# Patient Record
Sex: FEMALE | Race: WHITE | NOT HISPANIC OR LATINO | Employment: OTHER | URBAN - METROPOLITAN AREA
[De-identification: names, ages, dates, MRNs, and addresses within clinical notes are randomized per-mention and may not be internally consistent; named-entity substitution may affect disease eponyms.]

---

## 2017-07-26 ENCOUNTER — HOSPITAL ENCOUNTER (OUTPATIENT)
Facility: MEDICAL CENTER | Age: 27
End: 2017-07-26
Attending: SPECIALIST

## 2019-12-28 ENCOUNTER — APPOINTMENT (OUTPATIENT)
Dept: RADIOLOGY | Facility: MEDICAL CENTER | Age: 29
End: 2019-12-28
Payer: COMMERCIAL

## 2019-12-28 ENCOUNTER — HOSPITAL ENCOUNTER (EMERGENCY)
Facility: MEDICAL CENTER | Age: 29
End: 2019-12-28
Attending: EMERGENCY MEDICINE
Payer: COMMERCIAL

## 2019-12-28 ENCOUNTER — APPOINTMENT (OUTPATIENT)
Dept: RADIOLOGY | Facility: MEDICAL CENTER | Age: 29
End: 2019-12-28
Attending: EMERGENCY MEDICINE
Payer: COMMERCIAL

## 2019-12-28 VITALS
BODY MASS INDEX: 22.27 KG/M2 | WEIGHT: 125.66 LBS | HEIGHT: 63 IN | DIASTOLIC BLOOD PRESSURE: 68 MMHG | RESPIRATION RATE: 14 BRPM | TEMPERATURE: 98.7 F | HEART RATE: 72 BPM | OXYGEN SATURATION: 98 % | SYSTOLIC BLOOD PRESSURE: 110 MMHG

## 2019-12-28 DIAGNOSIS — R00.2 PALPITATIONS: ICD-10-CM

## 2019-12-28 LAB
ALBUMIN SERPL BCP-MCNC: 4.7 G/DL (ref 3.2–4.9)
ALBUMIN/GLOB SERPL: 1.6 G/DL
ALP SERPL-CCNC: 55 U/L (ref 30–99)
ALT SERPL-CCNC: 10 U/L (ref 2–50)
ANION GAP SERPL CALC-SCNC: 13 MMOL/L (ref 0–11.9)
AST SERPL-CCNC: 16 U/L (ref 12–45)
BASOPHILS # BLD AUTO: 0.8 % (ref 0–1.8)
BASOPHILS # BLD: 0.07 K/UL (ref 0–0.12)
BILIRUB SERPL-MCNC: 0.3 MG/DL (ref 0.1–1.5)
BUN SERPL-MCNC: 10 MG/DL (ref 8–22)
CALCIUM SERPL-MCNC: 9 MG/DL (ref 8.4–10.2)
CHLORIDE SERPL-SCNC: 106 MMOL/L (ref 96–112)
CO2 SERPL-SCNC: 23 MMOL/L (ref 20–33)
CREAT SERPL-MCNC: 0.62 MG/DL (ref 0.5–1.4)
EKG IMPRESSION: NORMAL
EOSINOPHIL # BLD AUTO: 0.12 K/UL (ref 0–0.51)
EOSINOPHIL NFR BLD: 1.4 % (ref 0–6.9)
ERYTHROCYTE [DISTWIDTH] IN BLOOD BY AUTOMATED COUNT: 36.7 FL (ref 35.9–50)
GLOBULIN SER CALC-MCNC: 2.9 G/DL (ref 1.9–3.5)
GLUCOSE SERPL-MCNC: 108 MG/DL (ref 65–99)
HCG SERPL QL: NEGATIVE
HCT VFR BLD AUTO: 41 % (ref 37–47)
HGB BLD-MCNC: 13.7 G/DL (ref 12–16)
IMM GRANULOCYTES # BLD AUTO: 0.03 K/UL (ref 0–0.11)
IMM GRANULOCYTES NFR BLD AUTO: 0.4 % (ref 0–0.9)
LYMPHOCYTES # BLD AUTO: 3.05 K/UL (ref 1–4.8)
LYMPHOCYTES NFR BLD: 36.4 % (ref 22–41)
MAGNESIUM SERPL-MCNC: 1.9 MG/DL (ref 1.5–2.5)
MCH RBC QN AUTO: 30 PG (ref 27–33)
MCHC RBC AUTO-ENTMCNC: 33.4 G/DL (ref 33.6–35)
MCV RBC AUTO: 89.9 FL (ref 81.4–97.8)
MONOCYTES # BLD AUTO: 0.48 K/UL (ref 0–0.85)
MONOCYTES NFR BLD AUTO: 5.7 % (ref 0–13.4)
NEUTROPHILS # BLD AUTO: 4.62 K/UL (ref 2–7.15)
NEUTROPHILS NFR BLD: 55.3 % (ref 44–72)
NRBC # BLD AUTO: 0 K/UL
NRBC BLD-RTO: 0 /100 WBC
PLATELET # BLD AUTO: 279 K/UL (ref 164–446)
PMV BLD AUTO: 10.5 FL (ref 9–12.9)
POTASSIUM SERPL-SCNC: 3.8 MMOL/L (ref 3.6–5.5)
PROT SERPL-MCNC: 7.6 G/DL (ref 6–8.2)
RBC # BLD AUTO: 4.56 M/UL (ref 4.2–5.4)
SODIUM SERPL-SCNC: 142 MMOL/L (ref 135–145)
T4 FREE SERPL-MCNC: 1.21 NG/DL (ref 0.58–1.64)
TROPONIN T SERPL-MCNC: <6 NG/L (ref 6–19)
TSH SERPL DL<=0.005 MIU/L-ACNC: 1.91 UIU/ML (ref 0.38–5.33)
WBC # BLD AUTO: 8.4 K/UL (ref 4.8–10.8)

## 2019-12-28 PROCEDURE — 80053 COMPREHEN METABOLIC PANEL: CPT

## 2019-12-28 PROCEDURE — 93005 ELECTROCARDIOGRAM TRACING: CPT

## 2019-12-28 PROCEDURE — 83735 ASSAY OF MAGNESIUM: CPT

## 2019-12-28 PROCEDURE — 36415 COLL VENOUS BLD VENIPUNCTURE: CPT

## 2019-12-28 PROCEDURE — 99284 EMERGENCY DEPT VISIT MOD MDM: CPT

## 2019-12-28 PROCEDURE — 84443 ASSAY THYROID STIM HORMONE: CPT

## 2019-12-28 PROCEDURE — 85025 COMPLETE CBC W/AUTO DIFF WBC: CPT

## 2019-12-28 PROCEDURE — 71045 X-RAY EXAM CHEST 1 VIEW: CPT

## 2019-12-28 PROCEDURE — 93005 ELECTROCARDIOGRAM TRACING: CPT | Performed by: EMERGENCY MEDICINE

## 2019-12-28 PROCEDURE — 84484 ASSAY OF TROPONIN QUANT: CPT

## 2019-12-28 PROCEDURE — 84703 CHORIONIC GONADOTROPIN ASSAY: CPT

## 2019-12-28 PROCEDURE — 84439 ASSAY OF FREE THYROXINE: CPT

## 2019-12-28 SDOH — HEALTH STABILITY: MENTAL HEALTH: HOW OFTEN DO YOU HAVE A DRINK CONTAINING ALCOHOL?: NEVER

## 2019-12-29 NOTE — ED PROVIDER NOTES
ED Provider Note    CHIEF COMPLAINT  Chief Complaint   Patient presents with   • Palpitations       HPI  Patient is a 29-year-old female with a past medical history of intermittent palpitations who presents for continued sensations of a fast heart rate since yesterday.  She describes this as being similar onset with no specific triggers.  She does report having increased anxiety with the holidays, work and family and other related stresses previously was not having these problems for approximately 3 years.  When she did have these problems 3 years ago she underwent a Holter monitor evaluation and had no definitive findings on this work-up.  Since then she has been doing well.  She did note that she had at her appointment earlier this morning was feeling like she was having a sensation of a fast heart rate every 20 seconds or so that would last for several seconds and then resolved.  This went off and on regardless of positional movements or exertion.  She did have a single episode of feeling lightheaded upon standing after her appointment.  She otherwise denies any chest pressure or chest pain.  She has had some nausea without vomiting and has no acute bowel or bladder complaints.    She denies any recent traumatic injuries, falls, assaults.  He does report nonspecific weight loss of approximately 15 pounds over the last several months that she does not attribute to any increased exercise or dieting.  She does not endorse any temperature fluctuations, brittle hair, neck pain or swelling, and has no family history of thyroid disease.  She also denies any first-degree relatives with any known structural heart disease, early cardiac death.    REVIEW OF SYSTEMS  See HPI for further details. All other systems are negative.     PAST MEDICAL HISTORY     SOCIAL HISTORY  Social History     Tobacco Use   • Smoking status: Never Smoker   • Smokeless tobacco: Never Used   Substance and Sexual Activity   • Alcohol use: Never      "Frequency: Never   • Drug use: Never   • Sexual activity: Not on file     SURGICAL HISTORY  patient denies any surgical history    CURRENT MEDICATIONS  Home Medications    **Home medications have not yet been reviewed for this encounter**       ALLERGIES  No Known Allergies    PHYSICAL EXAM  VITAL SIGNS: /68   Pulse 72   Temp 37.1 °C (98.7 °F) (Temporal)   Resp 14   Ht 1.6 m (5' 3\")   Wt 57 kg (125 lb 10.6 oz)   LMP 12/14/2019 (Exact Date)   SpO2 98%   BMI 22.26 kg/m²   Pulse ox interpretation: I interpret this pulse ox as normal.  Genl: F sitting in chair comfortably, speaking clearly, appears in no acute distress   Head: NC/AT   ENT: Mucous membranes moist, posterior pharynx clear, uvula midline, nares patent bilaterally   Eyes: Normal sclera, pupils equal round reactive to light  Neck: Supple, FROM, no LAD appreciated   Pulmonary: Lungs are clear to auscultation bilaterally  Chest: No TTP  CV:  RRR, no murmur appreciated, pulses 2+ in both upper and lower extremities,  Abdomen: soft, NT/ND; no rebound/guarding, no masses palpated, no HSM   : no CVA or suprapubic tenderness  Musculoskeletal: Pain free ROM of the neck. Moving upper and lower extremities and spontaneous in coordinated fashion  Neuro: A&Ox4 (person, place, time, situation), speech fluent, gait steady, no focal deficits appreciated, No cerebellar signs.  Sensation is grossly intact in the distal upper and lower extremities  5/5 strength in  and dorsiflexion/plantar flexion of the ankles  Psych: Patient has an appropriate affect and behavior  Skin: No rash or lesions.  No pallor or jaundice.  No cyanosis.  Warm and dry.     DIAGNOSTIC STUDIES / PROCEDURES    EKG  EKG at 1647 shows: rate of 88, rhythm sinus, axis normal, intervals normal, QRS narrow, ST/T waves without acute ischemia or infarction    LABS  Labs Reviewed   CBC WITH DIFFERENTIAL - Abnormal; Notable for the following components:       Result Value    MCHC 33.4 (*)     " All other components within normal limits   COMP METABOLIC PANEL - Abnormal; Notable for the following components:    Anion Gap 13.0 (*)     Glucose 108 (*)     All other components within normal limits   TROPONIN   ESTIMATED GFR   MAGNESIUM   HCG QUAL SERUM   FREE THYROXINE   TSH     RADIOLOGY  DX-CHEST-PORTABLE (1 VIEW)   Final Result      No acute cardiopulmonary disease.        COURSE & MEDICAL DECISION MAKING  Pertinent Labs & Imaging studies reviewed. (See chart for details)    DDX:  ACS unlikely  Pneumothorax  Pulmonary embolism  Esophagitis  Electrolyte abnormality  Myocarditis  Endocarditis  Pancreatitis  GERD  Anxiety  Thyroid dysfunction    MDM    Initial evaluation at 1820:  Patient presents with a chief complaint of intermittent palpitations with a history very remote and with current continued sensations of palpitations despite not having any marked arrhythmias on the monitor.  With my initial primary concern includes the differential listed above. Triage vital signs are reviewed and are reassuring. PERC score is negative.  On further discussion the patient does have a remote history of high-dose estrogen use though this is many many months removed prior to the initiation of her current complaints.  She is not currently on estrogen therapy, had no hypoxia or other acute risk factors at this time and after discussion of the pros and cons of getting a dimer testing I do not feel like she is demonstrating any signs of right heart strain and with a negative troponin or any other acute evidence of strain we will proceed with the other broad differential as noted above and no CTA is obtained.    A chest xray obtained and is unremarkable for pneumonia, pneumothorax, or widened mediastinum on my read. Laboratory analysis is obtained to evaluate for myocardial ischemia, evidence of infection, electrolyte abnormality, and abdominal sources for a cause for the patient's chest pain which are all reassuring.   Normal Mag and TSH. Troponin is not elevated.  Low likelihood of ACS at this time. EKG without acute ischemia, infarction, dysrhythmia.    ?  As there are no significant laboratory findings, a nonischemic EKG, and troponin.  There is no gross thyroid derangement or electrolyte abnormalities.  As she has been on a cardiac monitor for the entirety of these work-ups and no acute dysrhythmia has been identified I do not think there is an acute cardiac component.  She will need to follow-up with her outpatient physician to schedule any further Holter monitoring and further testing.  Questions are addressed at this time and she is given very strict return precautions should she feel short of breath, develop chest pain, or is acutely worsening.  The patient verbalizes understanding and will return for any interval changes.    HEART SCORE:0    FINAL IMPRESSION  Visit Diagnoses     ICD-10-CM   1. Palpitations R00.2     Electronically signed by: Cruzito Diaz, 12/28/2019 6:18 PM

## 2019-12-29 NOTE — ED NOTES
Pt given discharge instructions. RN answered questions. VSS. Pt ambulated steadily out to [place] [facility].

## 2019-12-29 NOTE — ED TRIAGE NOTES
"Presents  complaining of acute onset of palpitations recurring since yesterday.  She recalls a similar symptomatology approximately 3 years ago with Holter monitor placement, and inconclusive findings.  Chief Complaint   Patient presents with   • Palpitations     /77   Pulse 89   Temp 36.6 °C (97.8 °F) (Temporal)   Resp 20   Ht 1.6 m (5' 3\")   Wt 57 kg (125 lb 10.6 oz)   LMP 12/14/2019 (Exact Date)   SpO2 98%   BMI 22.26 kg/m²     "

## 2020-02-04 ENCOUNTER — TELEPHONE (OUTPATIENT)
Dept: SCHEDULING | Facility: IMAGING CENTER | Age: 30
End: 2020-02-04

## 2020-03-16 ENCOUNTER — OFFICE VISIT (OUTPATIENT)
Dept: MEDICAL GROUP | Facility: PHYSICIAN GROUP | Age: 30
End: 2020-03-16
Payer: COMMERCIAL

## 2020-03-16 VITALS
OXYGEN SATURATION: 95 % | SYSTOLIC BLOOD PRESSURE: 110 MMHG | HEIGHT: 63 IN | BODY MASS INDEX: 23.18 KG/M2 | DIASTOLIC BLOOD PRESSURE: 60 MMHG | HEART RATE: 86 BPM | WEIGHT: 130.8 LBS | TEMPERATURE: 98.4 F | RESPIRATION RATE: 16 BRPM

## 2020-03-16 DIAGNOSIS — F41.9 ANXIETY: ICD-10-CM

## 2020-03-16 DIAGNOSIS — R00.2 PALPITATIONS: ICD-10-CM

## 2020-03-16 DIAGNOSIS — L70.0 ACNE VULGARIS: ICD-10-CM

## 2020-03-16 PROCEDURE — 99204 OFFICE O/P NEW MOD 45 MIN: CPT | Performed by: FAMILY MEDICINE

## 2020-03-16 RX ORDER — DOXYCYCLINE HYCLATE 100 MG
100 TABLET ORAL 2 TIMES DAILY
Qty: 180 TAB | Refills: 0 | Status: SHIPPED | OUTPATIENT
Start: 2020-03-16 | End: 2020-06-14

## 2020-03-16 RX ORDER — ESCITALOPRAM OXALATE 10 MG/1
10 TABLET ORAL DAILY
Qty: 60 TAB | Refills: 0 | Status: SHIPPED
Start: 2020-03-16 | End: 2021-01-21

## 2020-03-16 SDOH — HEALTH STABILITY: MENTAL HEALTH: HOW OFTEN DO YOU HAVE A DRINK CONTAINING ALCOHOL?: NOT ASKED

## 2020-03-16 ASSESSMENT — ANXIETY QUESTIONNAIRES
6. BECOMING EASILY ANNOYED OR IRRITABLE: MORE THAN HALF THE DAYS
GAD7 TOTAL SCORE: 14
3. WORRYING TOO MUCH ABOUT DIFFERENT THINGS: NEARLY EVERY DAY
1. FEELING NERVOUS, ANXIOUS, OR ON EDGE: NEARLY EVERY DAY
5. BEING SO RESTLESS THAT IT IS HARD TO SIT STILL: NOT AT ALL
2. NOT BEING ABLE TO STOP OR CONTROL WORRYING: NEARLY EVERY DAY
7. FEELING AFRAID AS IF SOMETHING AWFUL MIGHT HAPPEN: NOT AT ALL
4. TROUBLE RELAXING: NEARLY EVERY DAY

## 2020-03-16 ASSESSMENT — FIBROSIS 4 INDEX: FIB4 SCORE: 0.53

## 2020-03-16 NOTE — ASSESSMENT & PLAN NOTE
This is a chronic problem.   She has an active acne problem including cheeks, and upper chest and back.  Used to see dermatology for this and was put on doxycycline 100mg BID long-term.   Requesting refill today.

## 2020-03-16 NOTE — PROGRESS NOTES
Subjective:     CC:  Diagnoses of Palpitations, Anxiety, and Acne vulgaris were pertinent to this visit.    HISTORY OF THE PRESENT ILLNESS: Patient is a 29 y.o. female. This pleasant patient is here today to establish care and discuss the following problems.   Prior PCP: None.    Palpitations  This is a chronic condition.    Symptom onset:  The patient has been complaining of persistent on and off palpitations for about 2 months.   Current symptoms: endorses daily feelings of palpitations and episodes last for about 2 hrs.   Since onset symptoms are: Unchanged  Modifying factors: She had to go to the emergency room December 2019 for this and had a comprehensive work-up including for thyroid or cardiac problems.  While in emergency room there were no arrhythmias or palpitations recorded.  All blood work was completely normal.  She does mention having some anxiety which appears to be getting out of control. About 4 years ago she had more intermittent type of palpitations and a cardiologist did a holter monitor, TTE, and stress test and everything was normal. Cards said it was anxiety.   Treatments tried: has cut down on caffeine intake.   Associated symptoms: Denies any chest pain or SOB. Endorses anxiety.       Anxiety  This is a new problem.  Patient reports worsening anxiety over the past couple of months and is inquiring about medicine.  She is seeing a therapist for anxiety.   Therapist recommends speaking to a physician about medicine.     SETH-7 Questionnaire    Feeling nervous, anxious, or on edge: Nearly every day  Not being able to sop or control worrying: Nearly every day  Worrying too much about different things: Nearly every day  Trouble relaxing: Nearly every day  Being so restless that it's hard to sit still: Not at all  Becoming easily annoyed or irritable: More than half the days  Feeling afraid as if something awful might happen: Not at all  Total: 14    Interpretation of SETH 7 Total Score   Score  "Severity :  0-4 No Anxiety   5-9 Mild Anxiety  10-14 Moderate Anxiety  15-21 Severe Anxiety        Acne vulgaris  This is a chronic problem.   She has an active acne problem including cheeks, and upper chest and back.  Used to see dermatology for this and was put on doxycycline 100mg BID long-term.   Requesting refill today.       Allergies: Patient has no known allergies.    Current Outpatient Medications Ordered in Epic   Medication Sig Dispense Refill   • escitalopram (LEXAPRO) 10 MG Tab Take 1 Tab by mouth every day. 60 Tab 0   • doxycycline (VIBRAMYCIN) 100 MG Tab Take 1 Tab by mouth 2 times a day for 90 days. 180 Tab 0     No current Epic-ordered facility-administered medications on file.        Past Medical History:   Diagnosis Date   • Anxiety        Past Surgical History:   Procedure Laterality Date   • LUMPECTOMY         Social History     Tobacco Use   • Smoking status: Never Smoker   • Smokeless tobacco: Never Used   Substance Use Topics   • Alcohol use: Not Currently   • Drug use: Never       Social History     Social History Narrative   • Not on file       Family History   Problem Relation Age of Onset   • No Known Problems Mother    • Hypertension Father        Health Maintenance: Completed    ROS:   Gen: no fevers/chills, no changes in weight  Eyes: no changes in vision  ENT: no sore throat, no hearing loss, no bloody nose  Pulm: no sob, no cough  CV: no chest pain  GI: no nausea/vomiting, no diarrhea  : no dysuria  MSk: no myalgias  Skin: no rash  Neuro: no headaches, no numbness/tingling      Objective:     Exam: /60 (BP Location: Left arm, Patient Position: Sitting, BP Cuff Size: Adult)   Pulse 86   Temp 36.9 °C (98.4 °F) (Temporal)   Resp 16   Ht 1.6 m (5' 3\")   Wt 59.3 kg (130 lb 12.8 oz)   SpO2 95%  Body mass index is 23.17 kg/m².    General: Normal appearing. No distress.  HENT: Normocephalic. Ears normal shape and contour, canals are clear bilaterally, tympanic membranes are " benign, nasal mucosa benign, oropharynx is without erythema, edema or exudates.   Eyes: Conjunctiva clear lids without ptosis, pupils equal and reactive to light accommodation.  Neck: Supple without JVD. Thyroid is not enlarged.  Pulmonary: Clear to ausculation.  Normal effort. No rales, ronchi, or wheezing.  Cardiovascular: Regular rate and rhythm without murmur. Radial pulses are intact and equal bilaterally.  Abdomen: Soft, nontender, nondistended. Normal bowel sounds. Liver and spleen are not palpable  Neurologic: Moves all extremities  Lymph: No cervical or supraclavicular lymph nodes are palpable  Skin: Warm and dry.  No obvious lesions.  Musculoskeletal: Normal gait. No extremity cyanosis, clubbing, or edema.  Psych: Normal mood and affect. Alert and oriented x3. Judgment and insight is normal.    Assessment & Plan:   29 y.o. female with the following -    1. Palpitations  This is a chronic, worsening condition.  The patient has been experiencing worsening palpitations over the past couple of months.  In fact, she had to go to the emergency room December 2018 for this and had a comprehensive work-up which was otherwise normal.  It was recommended for her to follow-up with a PCP.  She is having ongoing palpitations throughout the day and episodes often last hours.  I am highly suspicious for an anxiety component.  See #2.  I did offer her a referral to cardiology for further evaluation but she would like to treat her possible anxiety first.    2. Anxiety  This is a chronic, worsening condition.  The patient has been experiencing worsening anxiety over the past couple of months.  She was trying to do IVF which exacerbated this anxiety.  She has been seen a therapist whom recommended evaluation by physician.  She is amenable to starting pharmacotherapy at this time.  We will commence Lexapro 10 mg daily which may be titrated to 20 mg daily after 1 week.  She denies any suicidality/homicidality.  Hasmukh 7 screening  is positive for moderate anxiety at 14 points.  - escitalopram (LEXAPRO) 10 MG Tab; Take 1 Tab by mouth every day.  Dispense: 60 Tab; Refill: 0    3. Acne vulgaris  There is a chronic, stable condition.  The patient has a history of acne vulgaris previously managed by a dermatologist in [place].  She was on doxycycline 100 mg twice daily long-term.  She is requesting a refill for this.  We will try to minimize the duration to 3 months and reassess at that point.  - doxycycline (VIBRAMYCIN) 100 MG Tab; Take 1 Tab by mouth 2 times a day for 90 days.  Dispense: 180 Tab; Refill: 0      Return in about 4 weeks (around 4/13/2020) for FU on anxiety .    Please note that this dictation was created using voice recognition software. I have made every reasonable attempt to correct obvious errors, but I expect that there are errors of grammar and possibly content that I did not discover before finalizing the note.

## 2020-03-16 NOTE — ASSESSMENT & PLAN NOTE
This is a new problem.  Patient reports worsening anxiety over the past couple of months and is inquiring about medicine.  She is seeing a therapist for anxiety.   Therapist recommends speaking to a physician about medicine.     SETH-7 Questionnaire    Feeling nervous, anxious, or on edge: Nearly every day  Not being able to sop or control worrying: Nearly every day  Worrying too much about different things: Nearly every day  Trouble relaxing: Nearly every day  Being so restless that it's hard to sit still: Not at all  Becoming easily annoyed or irritable: More than half the days  Feeling afraid as if something awful might happen: Not at all  Total: 14    Interpretation of SETH 7 Total Score   Score Severity :  0-4 No Anxiety   5-9 Mild Anxiety  10-14 Moderate Anxiety  15-21 Severe Anxiety

## 2020-03-16 NOTE — ASSESSMENT & PLAN NOTE
This is a chronic condition.    Symptom onset:  The patient has been complaining of persistent on and off palpitations for about 2 months.   Current symptoms: endorses daily feelings of palpitations and episodes last for about 2 hrs.   Since onset symptoms are: Unchanged  Modifying factors: She had to go to the emergency room December 2019 for this and had a comprehensive work-up including for thyroid or cardiac problems.  While in emergency room there were no arrhythmias or palpitations recorded.  All blood work was completely normal.  She does mention having some anxiety which appears to be getting out of control. About 4 years ago she had more intermittent type of palpitations and a cardiologist did a holter monitor, TTE, and stress test and everything was normal. Cards said it was anxiety.   Treatments tried: has cut down on caffeine intake.   Associated symptoms: Denies any chest pain or SOB. Endorses anxiety.

## 2020-04-13 ENCOUNTER — TELEMEDICINE (OUTPATIENT)
Dept: MEDICAL GROUP | Facility: PHYSICIAN GROUP | Age: 30
End: 2020-04-13
Payer: COMMERCIAL

## 2020-04-13 VITALS — TEMPERATURE: 97.8 F | BODY MASS INDEX: 21.65 KG/M2 | WEIGHT: 122.2 LBS | HEIGHT: 63 IN

## 2020-04-13 DIAGNOSIS — R00.2 PALPITATIONS: ICD-10-CM

## 2020-04-13 DIAGNOSIS — L70.0 ACNE VULGARIS: ICD-10-CM

## 2020-04-13 DIAGNOSIS — F41.9 ANXIETY: ICD-10-CM

## 2020-04-13 PROCEDURE — 99213 OFFICE O/P EST LOW 20 MIN: CPT | Mod: 95,CR | Performed by: FAMILY MEDICINE

## 2020-04-13 RX ORDER — ESCITALOPRAM OXALATE 10 MG/1
15 TABLET ORAL DAILY
Qty: 135 TAB | Refills: 0 | Status: SHIPPED
Start: 2020-04-13 | End: 2020-05-28

## 2020-04-13 ASSESSMENT — ANXIETY QUESTIONNAIRES
6. BECOMING EASILY ANNOYED OR IRRITABLE: SEVERAL DAYS
3. WORRYING TOO MUCH ABOUT DIFFERENT THINGS: SEVERAL DAYS
GAD7 TOTAL SCORE: 5
5. BEING SO RESTLESS THAT IT IS HARD TO SIT STILL: NOT AT ALL
2. NOT BEING ABLE TO STOP OR CONTROL WORRYING: SEVERAL DAYS
4. TROUBLE RELAXING: NOT AT ALL
1. FEELING NERVOUS, ANXIOUS, OR ON EDGE: SEVERAL DAYS
7. FEELING AFRAID AS IF SOMETHING AWFUL MIGHT HAPPEN: SEVERAL DAYS

## 2020-04-13 ASSESSMENT — FIBROSIS 4 INDEX: FIB4 SCORE: 0.53

## 2020-04-13 NOTE — PROGRESS NOTES
Telemedicine Visit: Established Patient     This encounter was conducted via Zoom .   Verbal consent was obtained. Patient's identity was verified.    Subjective:   CC:   Chief Complaint   Patient presents with   • Anxiety     med refill        Evelyn Carter is a 29 y.o. female presenting for evaluation and management of:    #anxiety  Symptom onset:  The patient has a history of anxiety since January 2020.  She presents for follow-up today after initiating pharmacotherapy.  Current symptoms: Reports improvement with lexapro.   Since onset symptoms are: Unchanged  Modifying factors: She has been seeing a therapist whom recommended evaluation by physician.  We started Lexapro 10 mg daily back in 3/16/2020. She than went to 20mg and didn't like the side effects. She then went down to 1.5 tablets per day. She feels better with this done.   Treatments tried: Psychotherapy and pharmacotherapy with Lexapro   Associated symptoms: Denies any suicidality/homicidality.    SETH-7 Questionnaire    Feeling nervous, anxious, or on edge: Several days  Not being able to sop or control worrying: Several days  Worrying too much about different things: Several days  Trouble relaxing: Not at all  Being so restless that it's hard to sit still: Not at all  Becoming easily annoyed or irritable: Several days  Feeling afraid as if something awful might happen: Several days  Total: 5    Interpretation of SETH 7 Total Score   Score Severity :  0-4 No Anxiety   5-9 Mild Anxiety  10-14 Moderate Anxiety  15-21 Severe Anxiety      ROS  Denies any recent fevers or chills. No nausea or vomiting. No chest pains or shortness of breath.     No Known Allergies    Current medicines (including changes today)  Current Outpatient Medications   Medication Sig Dispense Refill   • escitalopram (LEXAPRO) 10 MG Tab Take 1.5 Tabs by mouth every day for 90 days. 135 Tab 0   • escitalopram (LEXAPRO) 10 MG Tab Take 1 Tab by mouth every day. 60 Tab 0   •  "doxycycline (VIBRAMYCIN) 100 MG Tab Take 1 Tab by mouth 2 times a day for 90 days. 180 Tab 0     No current facility-administered medications for this visit.        Patient Active Problem List    Diagnosis Date Noted   • Palpitations 03/16/2020   • Anxiety 03/16/2020   • Acne vulgaris 03/16/2020       Family History   Problem Relation Age of Onset   • No Known Problems Mother    • Hypertension Father        She  has a past medical history of Anxiety.  She  has a past surgical history that includes lumpectomy.       Objective:   Vitals obtained by patient:  Temp 36.6 °C (97.8 °F) (Oral) Comment (Src): pt stated  Ht 1.6 m (5' 3\")   Wt 55.4 kg (122 lb 3.2 oz) Comment: pt stated  BMI 21.65 kg/m²       Physical Exam:  Constitutional: Alert, no distress, well-groomed.  Skin: No rashes in visible areas.  Eye: Round. Conjunctiva clear, lids normal. No icterus.   ENMT: Lips pink without lesions, good dentition, moist mucous membranes. Phonation normal.  Neck: No masses, no thyromegaly. Moves freely without pain.  CV: Pulse as reported by patient  Respiratory: Unlabored respiratory effort, no cough or audible wheeze  Psych: Alert and oriented x3, normal affect and mood.       Assessment and Plan:   The following treatment plan was discussed:     1. Anxiety  2. Palpitations  This is a chronic, stable condition.  The patient has a history of anxiety and returns today virtually for follow-up.  I last evaluated on 3/16/2020 and we commenced the Lexapro at 10 mg daily.  She titrated this medicine to 15 mg daily which appears to be doing wonders.  She feels a lot better.  Overall her palpitations have also gotten better.  At this time I will refill her medicine.  She will continue with this.  She will also continue with psychotherapy.  Denies any suicidality/homicidality.  - escitalopram (LEXAPRO) 10 MG Tab; Take 1.5 Tabs by mouth every day for 90 days.  Dispense: 135 Tab; Refill: 0      3. Acne vulgaris  This is a chronic, " stable condition.  The patient has a history of acne vulgaris and required doxycycline 100 mg twice daily for this.  She self discontinued this medicine as she is no longer having flares.  We will continue to monitor this.      Follow-up: Return in about 3 months (around 7/13/2020) for FU anxiety .

## 2020-05-28 ENCOUNTER — OFFICE VISIT (OUTPATIENT)
Dept: MEDICAL GROUP | Facility: PHYSICIAN GROUP | Age: 30
End: 2020-05-28
Payer: COMMERCIAL

## 2020-05-28 VITALS
HEIGHT: 63 IN | TEMPERATURE: 98.2 F | SYSTOLIC BLOOD PRESSURE: 110 MMHG | BODY MASS INDEX: 22.22 KG/M2 | DIASTOLIC BLOOD PRESSURE: 64 MMHG | RESPIRATION RATE: 16 BRPM | WEIGHT: 125.4 LBS | HEART RATE: 72 BPM | OXYGEN SATURATION: 98 %

## 2020-05-28 DIAGNOSIS — N92.6 IRREGULAR PERIODS/MENSTRUAL CYCLES: ICD-10-CM

## 2020-05-28 DIAGNOSIS — F41.9 ANXIETY: ICD-10-CM

## 2020-05-28 DIAGNOSIS — R00.2 PALPITATIONS: ICD-10-CM

## 2020-05-28 DIAGNOSIS — D22.9 BENIGN MOLE: ICD-10-CM

## 2020-05-28 PROCEDURE — 99214 OFFICE O/P EST MOD 30 MIN: CPT | Performed by: FAMILY MEDICINE

## 2020-05-28 RX ORDER — FLUOXETINE 10 MG/1
10 CAPSULE ORAL DAILY
Qty: 30 CAP | Refills: 3 | Status: SHIPPED
Start: 2020-05-28 | End: 2020-06-08

## 2020-05-28 ASSESSMENT — FIBROSIS 4 INDEX: FIB4 SCORE: 0.53

## 2020-05-28 ASSESSMENT — PATIENT HEALTH QUESTIONNAIRE - PHQ9: CLINICAL INTERPRETATION OF PHQ2 SCORE: 0

## 2020-05-28 NOTE — ASSESSMENT & PLAN NOTE
This is a chronic issue.  The patient has a hx of palpitations since 4 years ago likely due to anxiety  These have almost resolved but she notices that the caffeine elicits them.   Denies any chest pain or syncope.   She saw cardiology for this about 4 years ago and workup was normal. They believed it was due to anxiety.

## 2020-05-28 NOTE — PROGRESS NOTES
Subjective:     Chief Complaint   Patient presents with   • Medication Management     lexapro        HPI:   Evelyn presents today to discuss the following.    Irregular periods/menstrual cycles  This is a new problem.  The patient began to experienced but began to experience spotting for 2 weeks at a time since 3/20  She believes that this is a side effect from lexapro, which can do that in up to 10% of cases.      Anxiety  This is a chronic problem.  The patient endorses great improvement on her anxiety after starting lexapro 15mg daily.  Was also seeing a therapist but discontinued due to covid 19.   Denies any acute issues and would like to continue with SSRI therapy.     Palpitations  This is a chronic issue.  The patient has a hx of palpitations since 4 years ago likely due to anxiety  These have almost resolved but she notices that the caffeine elicits them.   Denies any chest pain or syncope.   She saw cardiology for this about 4 years ago and workup was normal. They believed it was due to anxiety.     Benign mole  This is a chronic issue  She has had a mole on her abdomen for the past 5 years  She would like to get this removed by dermatology  Has not grown or looked atypical.       Past Medical History:   Diagnosis Date   • Anxiety        Social History     Tobacco Use   • Smoking status: Never Smoker   • Smokeless tobacco: Never Used   Substance Use Topics   • Alcohol use: Not Currently   • Drug use: Never       Current Outpatient Medications Ordered in Epic   Medication Sig Dispense Refill   • FLUoxetine (PROZAC) 10 MG Cap Take 1 Cap by mouth every day. 30 Cap 3   • doxycycline (VIBRAMYCIN) 100 MG Tab Take 1 Tab by mouth 2 times a day for 90 days. 180 Tab 0     No current Epic-ordered facility-administered medications on file.        Allergies:  Patient has no known allergies.    Health Maintenance: Completed    ROS:  Gen: no fevers/chills, no changes in weight  Eyes: no changes in vision  ENT: no sore  "throat, no hearing loss, no bloody nose  Pulm: no sob, no cough  CV: no chest pain, no palpitations  GI: no nausea/vomiting, no diarrhea      Objective:     Exam:  /64 (BP Location: Left arm, Patient Position: Sitting, BP Cuff Size: Adult)   Pulse 72   Temp 36.8 °C (98.2 °F) (Temporal)   Resp 16   Ht 1.6 m (5' 3\")   Wt 56.9 kg (125 lb 6.4 oz)   SpO2 98%   BMI 22.21 kg/m²  Body mass index is 22.21 kg/m².    Gen: Alert and oriented, No apparent distress.  HENT: TMs are clear. Oropharynx is w/o erythema or exudates. Neck is supple without cervical lymphadenopathy. No JVD.   Eyes: PERRLA  Lungs: Normal effort, CTA bilaterally, no wheezes, rhonchi, or rales  CV: Regular rate and rhythm. No murmurs, rubs, or gallops.  Abdomen: Soft, nontender, nondistended. Normal bowel sounds. Liver and spleen are not palpable  Ext: No clubbing, cyanosis, edema.  Skin: no rash, lesions or ulcers.  Inspection of her abdomen shows a mole measuring approximately 4 mm in diameter.  Clear defined borders.  Appears benign in nature.  Neuro: Moves all extremities.  Psych: AAOx3      Assessment & Plan:     29 y.o. female with the following -     1. Anxiety  This is a chronic issue.  The patient has a history of anxiety which has responded very successfully to SSRI therapy.  She was prescribed Lexapro at 15 mg daily.  She tolerated this very well but unfortunately did not like the the fact that she began to experience irregular periods initiating therapy.  See #2.  We will do a direct switch to fluoxetine at 10 mg daily with close follow-up.  - FLUoxetine (PROZAC) 10 MG Cap; Take 1 Cap by mouth every day.  Dispense: 30 Cap; Refill: 3    2. Irregular periods/menstrual cycles  This is a new problem.  The patient began to experience irregular periods shortly after initiating Lexapro.  This can occur in up to 10% of cases.  As such, we will make a direct switch to fluoxetine which does not appear to have the same side effect.  The " patient is agreeable to plan.    3. Palpitations  This is a chronic issue.  Her palpitations have been under control.  I suspect anxiety is a likely etiology.  She has been evaluated by cardiology in the past was prescribed metoprolol but she did not tolerate this.  According to patient, the likely etiology was anxiety according to cardiology.  She would like to hold off on a cardiology referral at this time.    4. Benign mole  This is a chronic issue.  The patient has had a mole on her abdomen which has not grown in size but is bothersome to her.  She would like to be referred to dermatology for removal.  - REFERRAL TO DERMATOLOGY      Return in about 2 weeks (around 6/11/2020) for FU on irregular periods.    Please note that this dictation was created using voice recognition software. I have made every reasonable attempt to correct obvious errors, but I expect that there are errors of grammar and possibly content that I did not discover before finalizing the note.

## 2020-05-28 NOTE — ASSESSMENT & PLAN NOTE
This is a new problem.  The patient began to experienced but began to experience spotting for 2 weeks at a time since 3/20  She believes that this is a side effect from lexapro, which can do that in up to 10% of cases.

## 2020-05-28 NOTE — ASSESSMENT & PLAN NOTE
This is a chronic problem.  The patient endorses great improvement on her anxiety after starting lexapro 15mg daily.  Was also seeing a therapist but discontinued due to covid 19.   Denies any acute issues and would like to continue with SSRI therapy.

## 2020-05-28 NOTE — ASSESSMENT & PLAN NOTE
This is a chronic issue  She has had a mole on her abdomen for the past 5 years  She would like to get this removed by dermatology  Has not grown or looked atypical.

## 2020-06-08 DIAGNOSIS — F41.9 ANXIETY: ICD-10-CM

## 2020-06-08 RX ORDER — ESCITALOPRAM OXALATE 10 MG/1
15 TABLET ORAL DAILY
Qty: 135 TAB | Refills: 0 | Status: SHIPPED | OUTPATIENT
Start: 2020-06-08 | End: 2020-10-09 | Stop reason: SDUPTHER

## 2020-06-08 NOTE — TELEPHONE ENCOUNTER
From: Evelyn Carter  To: Office of Dominic Mendiola M.D.  Sent: 6/8/2020 3:15 PM PDT  Subject: Medication Renewal Request    Evelyn Carter would like a refill of the following medications:   Other - Lexapro. The prozac is making me feel weird. I would like to go back to lexapro. We can chat about it friday but in the meantime can I get this refilled?    Preferred pharmacy: [facility] #[id number] - [place] - [address] [place] [address]

## 2020-06-12 ENCOUNTER — APPOINTMENT (OUTPATIENT)
Dept: MEDICAL GROUP | Facility: PHYSICIAN GROUP | Age: 30
End: 2020-06-12
Payer: COMMERCIAL

## 2020-10-08 DIAGNOSIS — F41.9 ANXIETY: ICD-10-CM

## 2020-10-09 RX ORDER — ESCITALOPRAM OXALATE 10 MG/1
15 TABLET ORAL DAILY
Qty: 135 TAB | Refills: 0 | Status: SHIPPED | OUTPATIENT
Start: 2020-10-09 | End: 2021-01-21 | Stop reason: SDUPTHER

## 2020-10-09 NOTE — TELEPHONE ENCOUNTER
From: Evelyn Carter  To: Office of Dominic Mendiola M.D.  Sent: 10/8/2020 10:05 PM PDT  Subject: Medication Renewal Request    Refills have been requested for the following medications:   Other - Lexapro    Preferred pharmacy: [facility] #[id number] - [place], [place] - [address]  Delivery method: Pickup

## 2020-10-09 NOTE — TELEPHONE ENCOUNTER
Received request via: Patient     Was the patient seen in the last year in this department? Yes    Does the patient have an active prescription (recently filled or refills available) for medication(s) requested? No

## 2021-01-21 DIAGNOSIS — F41.9 ANXIETY: ICD-10-CM

## 2021-01-21 RX ORDER — ESCITALOPRAM OXALATE 10 MG/1
15 TABLET ORAL DAILY
Qty: 135 TAB | Refills: 0 | Status: SHIPPED | OUTPATIENT
Start: 2021-01-21 | End: 2021-04-21

## 2021-04-05 RX ORDER — NITROFURANTOIN 25; 75 MG/1; MG/1
100 CAPSULE ORAL 2 TIMES DAILY
Qty: 10 CAPSULE | Refills: 0 | Status: SHIPPED | OUTPATIENT
Start: 2021-04-05 | End: 2021-04-10